# Patient Record
Sex: FEMALE | Race: WHITE
[De-identification: names, ages, dates, MRNs, and addresses within clinical notes are randomized per-mention and may not be internally consistent; named-entity substitution may affect disease eponyms.]

---

## 2021-11-24 ENCOUNTER — HOSPITAL ENCOUNTER (EMERGENCY)
Dept: HOSPITAL 96 - M.ERS | Age: 36
Discharge: LEFT BEFORE DISCHARGE | End: 2021-11-24
Payer: MEDICARE

## 2021-11-24 VITALS — DIASTOLIC BLOOD PRESSURE: 86 MMHG | SYSTOLIC BLOOD PRESSURE: 141 MMHG

## 2021-11-24 VITALS — HEIGHT: 68 IN | WEIGHT: 293 LBS | BODY MASS INDEX: 44.41 KG/M2

## 2021-11-24 DIAGNOSIS — Z53.21: ICD-10-CM

## 2021-11-24 DIAGNOSIS — M25.512: Primary | ICD-10-CM

## 2021-11-24 DIAGNOSIS — R07.89: ICD-10-CM

## 2021-11-25 NOTE — EKG
[place] [address]
Phone:  [phone]                     ELECTROCARDIOGRAM REPORT      
_______________________________________________________________________________
 
Name:         DAMIAN SMITH               Room:                     [facility]#:    [id number]     Account #:     [id number]  
Admission:    21    Attend Phys:                     
Discharge:    21    Date of Birth: [birth year]  
Date of Service: 21 1508  Report #:      [id number]
        [id number]
_______________________________________________________________________________
THIS REPORT FOR:  //name//                      
 
                         [facility] ED
                                       
Test Date:    2021               Test Time:    15:08:49
Pat Name:     DAMIAN SMITH            Department:   
Patient ID:   [id number]            Room:          
Gender:                               Technician:   SONYA
:          1985               Requested By: Rosas Hall
Order Number: [id number] MD:   Antonio Marquis
                                 Measurements
Intervals                              Axis          
Rate:         86                       P:            38
UT:           164                      QRS:          39
QRSD:         81                       T:            20
QT:           349                                    
QTc:          418                                    
                           Interpretive Statements
Sinus rhythm
Ventricular premature complex
Low voltage, precordial leads
Consider anterior infarct
No previous ECG available for comparison
Electronically Signed On 2021 16:34:45 CST by Antonio Marquis
[email or web address]
 
 
 
 
 
 
 
 
 
 
 
 
 
 
 
 
 
 
 
  <ELECTRONICALLY SIGNED>
                                           By: Antonio Marquis MD, [facility]      
  21     1634
D: 21 1508   _____________________________________
T: 21 1508   Antonio Marquis MD, [facility]        /EPI